# Patient Record
Sex: FEMALE | Race: WHITE | NOT HISPANIC OR LATINO | URBAN - METROPOLITAN AREA
[De-identification: names, ages, dates, MRNs, and addresses within clinical notes are randomized per-mention and may not be internally consistent; named-entity substitution may affect disease eponyms.]

---

## 2021-09-13 ENCOUNTER — OFFICE VISIT - RIVER FALLS (OUTPATIENT)
Dept: FAMILY MEDICINE | Facility: CLINIC | Age: 41
End: 2021-09-13

## 2021-09-13 ASSESSMENT — MIFFLIN-ST. JEOR: SCORE: 1700.98

## 2022-02-11 VITALS — HEIGHT: 63 IN | WEIGHT: 236.3 LBS | BODY MASS INDEX: 41.87 KG/M2

## 2022-02-16 NOTE — NURSING NOTE
Quick Intake Entered On:  9/13/2021 5:08 PM CDT    Performed On:  9/13/2021 5:08 PM CDT by Adali Perrin               Summary   Weight Measured :   236.3 lb(Converted to: 236 lb 5 oz, 107.184 kg)    Height Measured :   63 in(Converted to: 5 ft 3 in, 160.02 cm)    Body Mass Index :   41.85 kg/m2 (HI)    Body Surface Area :   2.18 m2   Adali Perrin - 9/13/2021 5:08 PM CDT

## 2022-02-16 NOTE — PROGRESS NOTES
Patient:   TICO CATHERINE            MRN: [id number]            FIN: [id number]               Age:   40 years     Sex:  Female     :  1980   Associated Diagnoses:   Morbid (severe) obesity due to excess calories   Author:   Adali Perrin      Visit Information   Visit type:  Medical Nutrition Therapy.    Referral source:  [place] FERNÁNDEZ, Nicola, Dr. Osman Joyner [facility] .       Chief Complaint   Obesity class 3 BMI >40       Interval History   Pt is post op ~ 1 month from cholecystectomy.  Interested in healthy eating and weight loss education.    Pt has done weight watchers with varying success.    Exercise 10 min brisk walk 2x/ day and bought an exercise video game - willing to start.         Health Status   Allergies:    Allergic Reactions (Selected)  No Known Medication Allergies   Problem list:    All Problems (Selected)  Vitamin D deficiency, unspecified / SNOMED CT 83922612 / Confirmed  Vertigo / SNOMED CT 9826417718 / Confirmed  Tobacco use / SNOMED CT 8141373915 / Confirmed  Tinea corporis / SNOMED CT 561764408 / Confirmed  Premenstrual tension syndrome / SNOMED CT 649091885 / Confirmed  Other fatigue / SNOMED CT 218970652 / Confirmed  Morbid (severe) obesity due to excess calories / SNOMED CT 351906824 / Confirmed  Major depressive disorder, recurrent, moderate / SNOMED CT 36206582 / Confirmed  Injury of ulnar nerve at upper arm level, left arm, initial encounter / SNOMED CT 587674191 / Confirmed  GERD (gastroesophageal reflux disease) / SNOMED CT 652659597 / Confirmed  Essential (primary) hypertension / SNOMED CT 16012183 / Confirmed  ADHD (attention deficit hyperactivity disorder), combined type / SNOMED CT 30624831 / Confirmed      Histories   Past Medical History:    Active  Essential (primary) hypertension (85724481)  Morbid (severe) obesity due to excess calories (480510818)  ADHD (attention deficit hyperactivity disorder), combined type (79901239)  Other fatigue (210535509)  Major  depressive disorder, recurrent, moderate (20285464)  Tobacco use (9748705228)  Premenstrual tension syndrome (837615822)  GERD (gastroesophageal reflux disease) (230065838)  Injury of ulnar nerve at upper arm level, left arm, initial encounter (273950987)  Tinea corporis (920339375)  Vitamin D deficiency, unspecified (10611684)  Vertigo (9023985861)  Resolved  Pregnancy (156450170): Onset on 2/17/2017 at 36 years.  Resolved on 11/3/2017 at 37 years.  Pregnancy (669000132): Onset on 2/20/2006 at 25 years.  Resolved on 11/13/2006 at 26 years.  Pregnancy (010774950): Onset on 8/9/2003 at 22 years.  Resolved on 4/17/2004 at 23 years.   Family History:    Anxiety  Mother  Hypertension  Father  Crohn's disease  Mother  Clubfoot  Grandmother (P)     Procedure history:    Colposcopy (SNOMED CT 8145847961) on 2/28/2012 at 31 Years.   Social History:        Electronic Cigarette/Vaping Assessment            Electronic Cigarette Use: Never.      Alcohol Assessment: Past      Tobacco Assessment: Past            Former smoker, quit more than 30 days ago      Substance Abuse Assessment: Past      Employment and Education Assessment            Unemployed      Home and Environment Assessment            Marital status: Single.        Physical Examination   Measurements from flowsheet : Measurements   9/13/2021 5:08 PM CDT Height Measured - Standard 63 in    Weight Measured - Standard 236.3 lb    BSA 2.18 m2    Body Mass Index 41.85 kg/m2  HI         Impression and Plan   Diagnosis     Morbid (severe) obesity due to excess calories ([id number] E66.01).         Today patient was instructed on the importance of how nutrition and physical activity can impact weight status and improve overall health conditioning including improving self-esteem and increasing energy level.  We discussed why it is important to incorporate healthy lifestyle interventions to control overall health to prevent complications of being obese including the potential  of developing diabetes and preventing heart disease.   We discussed the food plate method.  Patient will be starting 1250 - 1350 calorie meal plan with appropriate portion sizes.  We discussed how to read food labels.  Patient is shown appropriate portion sizes of a variety of foods.   We discussed how calories in should be less than calories expended to achieve optimal weight loss.     Nutrition recommendations following cholecystectomy foods to eat/ avoid.    Hunger/ Fullness cues - use the chart to help identify how pt is feeling before, during, and after eating   Smart Snacking and 20 Ways to eat more fruit and vegetables.    Mindful eating - listening to body vs eating out of stress, boredom, emotion, eating to fuel body for strength and energy   Weight loss tips  Nutritional Psychiatry Your Brain on Food - discussion on how what you eat affects microbiome/ hormones and how you feel physically and emotionally.   Tracking food - patient is given reference to myfitnesspal.com or apps and encouraged to track daily intake of food and fluids   Provided resources for cooking/ recipe websites  Importance of daily physical activity above work schedule to promote endorphin release    reduce stress, anxiety, and depression, improve sleep, increase energy level, improve muscle tone and strength ...  recommendation to incorporate physical activity at least 5 days per week with minimum of 30-60 minutes of moderately intense activity to promote weight loss.      Goals:  3 fruit/ day  3+ vegetables/ day  Protein at each meal (protein shake option)  Record intake, 1250 - 1350 calorie meal plan       Professional Services   Time spent with pt 45 min   cc Dr. Lovelace   cc Dr. Osman Fried [facility]